# Patient Record
Sex: FEMALE | Race: WHITE | URBAN - METROPOLITAN AREA
[De-identification: names, ages, dates, MRNs, and addresses within clinical notes are randomized per-mention and may not be internally consistent; named-entity substitution may affect disease eponyms.]

---

## 2022-12-07 ENCOUNTER — EXTERNAL FACILITY (OUTPATIENT)
Dept: PULMONOLOGY | Facility: CLINIC | Age: 72
End: 2022-12-07

## 2022-12-07 DIAGNOSIS — Z72.0 TOBACCO ABUSE: ICD-10-CM

## 2022-12-07 DIAGNOSIS — G47.33 OSA (OBSTRUCTIVE SLEEP APNEA): ICD-10-CM

## 2022-12-07 DIAGNOSIS — J44.9 CHRONIC OBSTRUCTIVE PULMONARY DISEASE, UNSPECIFIED COPD TYPE (HCC): Primary | ICD-10-CM

## 2022-12-08 NOTE — PROGRESS NOTES
Pulmonary Consult Note  SNF External Facility - [facility]    History of Present Illness:   Tracey Garay is a(n) 67year old female with Afib on chronic coumadin, COPD, untreated HUSSAIN, HTN, DM, who I am now evaluating for COPD at [facility]. Pt tripped and fell and she developed extensive bruising to right knee. Found to have large hematoma which was evacuated. Patient is a current smoker with 50 pack-year history and is not motivated to quit. Refusing nicotine patch. States she follows with pulmonologist in outpatient setting and is maintained on Spiriva and Advair with as-needed albuterol. She has history of HUSSAIN though intolerant to PAP therapy. She frequently wheezes but states this is improved since hospitalization. She denies shortness of breath, cough, chest pain, pleurisy, fever, chills. She is breathing comfortably on room air. Past Medical History: COPD, Afib, HUSSAIN, tobacco abuse, HTN, HLD, DM, GERD, OA    Past Surgical History: Right LE hematoma evacuation and debridement    Family History: Not pertinent to rehab stay    Social History:  -Tobacco: Current, 1 ppd x 50 yeas, not motivated to quit  -Alcohol: None    Allergies: Penicillin    Medications: Reviewed on SNF EMR    Review of Systems: Vision normal. Ears nose and throat normal. Bowel normal. Bladder function normal. No thyroid disease. No depression. No rash. Muscles and joints unremarkable. No weight loss or weight gain. 10 point ROS completed and negative except as noted above. Pertinent positives and negatives as per HPI. Physical Exam:  /74, HR 87, RR 18, T 97.4, sat 97% on room air  Constitutional: Obese, awake, alert, NAD  HEENT: Head NC/AT, PEERL, MMM  Cardio: RRR, S1S2, no murmurs  Respiratory: Thorax symmetrical with no labored breathing. Good air exchange. Expiratory wheezing. No rales. GI: NABS. Abd soft, non-tender. Extremities: No clubbing or cyanosis. No LE edema. No calf tenderness. RLE with extensive ecchymosis. LUE with ecchymosis. Neurologic: A&Ox3. No gross motor deficits. Skin: Warm, dry. Ecchymosis as above. Lymphatic: No cervical or supraclavicular lymphadenopathy. Psych: Calm, cooperative. Pleasant affect. Assessment and Plan:  1. COPD - persistent wheezing, however, otherwise asymptomatic and saturating well on room air. Plan:  -Resume ICS/LABA/LAMA [Trelegy here; on Advair and Spiriva at home]  -Albuterol MDI prn    2. Tobacco abuse - extensive and not motivated to quit. Plan:  -Smoking cessation  -NRT - declines patch    3. Untreated HUSSAIN - intolerant to CPAP and adamantly refusing. Plan:  -Weight loss    Thank you for allowing me to participate in Jing's care. Greater than 35 minutes spent, >50% spent in face-to-face discussion with patient with additional time in review of medical records and documentation.     ANDREW YepezC  Pulmonary Medicine